# Patient Record
Sex: FEMALE | Race: WHITE | NOT HISPANIC OR LATINO
[De-identification: names, ages, dates, MRNs, and addresses within clinical notes are randomized per-mention and may not be internally consistent; named-entity substitution may affect disease eponyms.]

---

## 2019-02-22 ENCOUNTER — RECORDS - HEALTHEAST (OUTPATIENT)
Dept: ADMINISTRATIVE | Facility: OTHER | Age: 84
End: 2019-02-22

## 2020-01-01 ENCOUNTER — COMMUNICATION - HEALTHEAST (OUTPATIENT)
Dept: PHYSICAL MEDICINE AND REHAB | Facility: CLINIC | Age: 85
End: 2020-01-01

## 2020-01-01 ENCOUNTER — HOSPITAL ENCOUNTER (OUTPATIENT)
Dept: PHYSICAL MEDICINE AND REHAB | Facility: CLINIC | Age: 85
Discharge: HOME | End: 2020-08-19
Attending: PHYSICAL MEDICINE & REHABILITATION

## 2020-01-01 ENCOUNTER — RECORDS - HEALTHEAST (OUTPATIENT)
Dept: LAB | Facility: CLINIC | Age: 85
End: 2020-01-01

## 2020-01-01 ENCOUNTER — RECORDS - HEALTHEAST (OUTPATIENT)
Dept: BONE DENSITY | Facility: CLINIC | Age: 85
End: 2020-01-01

## 2020-01-01 ENCOUNTER — HOSPITAL ENCOUNTER (OUTPATIENT)
Dept: PHYSICAL MEDICINE AND REHAB | Facility: CLINIC | Age: 85
Discharge: HOME | End: 2020-08-12
Attending: PHYSICAL MEDICINE & REHABILITATION

## 2020-01-01 ENCOUNTER — AMBULATORY - HEALTHEAST (OUTPATIENT)
Dept: PHYSICAL MEDICINE AND REHAB | Facility: CLINIC | Age: 85
End: 2020-01-01

## 2020-01-01 ENCOUNTER — COMMUNICATION - HEALTHEAST (OUTPATIENT)
Dept: SCHEDULING | Facility: CLINIC | Age: 85
End: 2020-01-01

## 2020-01-01 ENCOUNTER — RECORDS - HEALTHEAST (OUTPATIENT)
Dept: ADMINISTRATIVE | Facility: OTHER | Age: 85
End: 2020-01-01

## 2020-01-01 ENCOUNTER — AMBULATORY - HEALTHEAST (OUTPATIENT)
Dept: OTHER | Facility: CLINIC | Age: 85
End: 2020-01-01

## 2020-01-01 ENCOUNTER — HOSPITAL ENCOUNTER (OUTPATIENT)
Dept: RADIOLOGY | Facility: HOSPITAL | Age: 85
Discharge: HOME | End: 2020-09-09
Attending: PHYSICAL MEDICINE & REHABILITATION

## 2020-01-01 ENCOUNTER — HOSPITAL ENCOUNTER (OUTPATIENT)
Dept: PHYSICAL MEDICINE AND REHAB | Facility: CLINIC | Age: 85
Discharge: HOME | End: 2020-07-15
Attending: STUDENT IN AN ORGANIZED HEALTH CARE EDUCATION/TRAINING PROGRAM

## 2020-01-01 ENCOUNTER — HOSPITAL ENCOUNTER (OUTPATIENT)
Dept: PHYSICAL MEDICINE AND REHAB | Facility: CLINIC | Age: 85
Discharge: HOME | End: 2020-09-09
Attending: PHYSICAL MEDICINE & REHABILITATION

## 2020-01-01 ENCOUNTER — COMMUNICATION - HEALTHEAST (OUTPATIENT)
Dept: INTERNAL MEDICINE | Facility: CLINIC | Age: 85
End: 2020-01-01

## 2020-01-01 ENCOUNTER — HOSPITAL ENCOUNTER (OUTPATIENT)
Dept: PHYSICAL MEDICINE AND REHAB | Facility: CLINIC | Age: 85
Discharge: HOME | End: 2020-07-22
Attending: PHYSICAL MEDICINE & REHABILITATION

## 2020-01-01 DIAGNOSIS — M80.88XA OSTEOPOROTIC COMPRESSION FRACTURE OF SPINE, INITIAL ENCOUNTER: ICD-10-CM

## 2020-01-01 DIAGNOSIS — M48.061 LUMBAR FORAMINAL STENOSIS: ICD-10-CM

## 2020-01-01 DIAGNOSIS — M54.42 CHRONIC LEFT-SIDED LOW BACK PAIN WITH LEFT-SIDED SCIATICA: ICD-10-CM

## 2020-01-01 DIAGNOSIS — M54.16 LUMBAR RADICULITIS: ICD-10-CM

## 2020-01-01 DIAGNOSIS — K59.03 THERAPEUTIC OPIOID INDUCED CONSTIPATION: ICD-10-CM

## 2020-01-01 DIAGNOSIS — T40.2X5A THERAPEUTIC OPIOID INDUCED CONSTIPATION: ICD-10-CM

## 2020-01-01 DIAGNOSIS — G89.29 CHRONIC LEFT-SIDED LOW BACK PAIN WITH LEFT-SIDED SCIATICA: ICD-10-CM

## 2020-01-01 DIAGNOSIS — S32.040B: ICD-10-CM

## 2020-01-01 DIAGNOSIS — M54.41 CHRONIC LEFT-SIDED LOW BACK PAIN WITH RIGHT-SIDED SCIATICA: ICD-10-CM

## 2020-01-01 DIAGNOSIS — M48.061 BILATERAL STENOSIS OF LATERAL RECESS OF LUMBAR SPINE: ICD-10-CM

## 2020-01-01 DIAGNOSIS — G89.29 CHRONIC LEFT-SIDED LOW BACK PAIN WITH RIGHT-SIDED SCIATICA: ICD-10-CM

## 2020-01-01 DIAGNOSIS — M54.50 CHRONIC RIGHT-SIDED LOW BACK PAIN WITHOUT SCIATICA: ICD-10-CM

## 2020-01-01 DIAGNOSIS — M54.42 ACUTE LEFT-SIDED LOW BACK PAIN WITH LEFT-SIDED SCIATICA: ICD-10-CM

## 2020-01-01 DIAGNOSIS — G89.29 CHRONIC RIGHT-SIDED LOW BACK PAIN WITHOUT SCIATICA: ICD-10-CM

## 2020-01-01 DIAGNOSIS — M80.88XA OTHER OSTEOPOROSIS WITH CURRENT PATHOLOGICAL FRACTURE, VERTEBRA(E), INITIAL ENCOUNTER FOR FRACTURE (H): ICD-10-CM

## 2020-01-01 LAB
25(OH)D3 SERPL-MCNC: 25.8 NG/ML (ref 30–80)
ALBUMIN SERPL-MCNC: 3.9 G/DL (ref 3.5–5)
ALP SERPL-CCNC: 60 U/L (ref 45–120)
ALT SERPL W P-5'-P-CCNC: 14 U/L (ref 0–45)
ANION GAP SERPL CALCULATED.3IONS-SCNC: 10 MMOL/L (ref 5–18)
AST SERPL W P-5'-P-CCNC: 17 U/L (ref 0–40)
BILIRUB SERPL-MCNC: 0.4 MG/DL (ref 0–1)
BUN SERPL-MCNC: 26 MG/DL (ref 8–28)
CALCIUM SERPL-MCNC: 9.6 MG/DL (ref 8.5–10.5)
CHLORIDE BLD-SCNC: 104 MMOL/L (ref 98–107)
CO2 SERPL-SCNC: 26 MMOL/L (ref 22–31)
CREAT SERPL-MCNC: 0.94 MG/DL (ref 0.6–1.1)
ERYTHROCYTE [DISTWIDTH] IN BLOOD BY AUTOMATED COUNT: 17.4 % (ref 11–14.5)
GFR SERPL CREATININE-BSD FRML MDRD: 55 ML/MIN/1.73M2
GLUCOSE BLD-MCNC: 109 MG/DL (ref 70–125)
HCT VFR BLD AUTO: 44.2 % (ref 35–47)
HGB BLD-MCNC: 14.2 G/DL (ref 12–16)
MCH RBC QN AUTO: 32.4 PG (ref 27–34)
MCHC RBC AUTO-ENTMCNC: 32.1 G/DL (ref 32–36)
MCV RBC AUTO: 101 FL (ref 80–100)
PLATELET # BLD AUTO: 244 THOU/UL (ref 140–440)
PMV BLD AUTO: 11.2 FL (ref 8.5–12.5)
POTASSIUM BLD-SCNC: 4.3 MMOL/L (ref 3.5–5)
PROT SERPL-MCNC: 6.7 G/DL (ref 6–8)
PTH-INTACT SERPL-MCNC: 66 PG/ML (ref 10–86)
RBC # BLD AUTO: 4.38 MILL/UL (ref 3.8–5.4)
SODIUM SERPL-SCNC: 140 MMOL/L (ref 136–145)
T4 FREE SERPL-MCNC: 0.9 NG/DL (ref 0.7–1.8)
T4 FREE SERPL-MCNC: 1 NG/DL (ref 0.7–1.8)
TSH SERPL DL<=0.005 MIU/L-ACNC: 3.03 UIU/ML (ref 0.3–5)
TSH SERPL DL<=0.005 MIU/L-ACNC: 5.09 UIU/ML (ref 0.3–5)
VIT B12 SERPL-MCNC: 281 PG/ML (ref 213–816)
WBC: 8.9 THOU/UL (ref 4–11)

## 2020-01-01 RX ORDER — CALCITONIN SALMON 200 [IU]/.09ML
1 SPRAY, METERED NASAL DAILY
Qty: 3.7 ML | Refills: 1 | Status: SHIPPED | OUTPATIENT
Start: 2020-01-01

## 2020-01-01 RX ORDER — ACETAMINOPHEN 500 MG
1000 TABLET ORAL 3 TIMES DAILY
Status: SHIPPED | COMMUNITY
Start: 2020-01-01

## 2020-01-01 RX ORDER — LISINOPRIL 30 MG/1
30 TABLET ORAL DAILY
Status: SHIPPED | COMMUNITY
Start: 2020-01-01

## 2020-01-01 ASSESSMENT — MIFFLIN-ST. JEOR: SCORE: 722.11

## 2021-06-01 ENCOUNTER — RECORDS - HEALTHEAST (OUTPATIENT)
Dept: ADMINISTRATIVE | Facility: CLINIC | Age: 86
End: 2021-06-01

## 2021-06-04 VITALS — WEIGHT: 96 LBS | BODY MASS INDEX: 19.35 KG/M2 | HEIGHT: 59 IN

## 2021-06-09 NOTE — TELEPHONE ENCOUNTER
"Per Dr Mendoza request - called living facility, [facility] (Spoke with \"Julisa\") - let them know that Miralax was ordered (should be ready to be picked up at the pharmacy - or family can purchase this over the counter) and they should give her 1 packet or capful which = 17 grams per day while she is taking Oxycodone.  This is important to take while she takes Oxycodone to prevent any constipation.       Julisa states understanding.  "

## 2021-06-09 NOTE — PATIENT INSTRUCTIONS - HE
Follow-up visit with Dr. Mendoza in 2 weeks to discuss injection outcome and determine care plan going forward.       DISCHARGE INSTRUCTIONS    During office hours (8:00 a.m.- 4:00 p.m.) questions or concerns may be answered  by calling Spine Center Navigation Nurses at  [phone].  Messages received after hours will be returned the following business day.      In the case of an emergency, please dial 911 or seek assistance at the nearest Emergency Room/Urgent Care facility.     All Patients:    ? You may experience an increase in your symptoms for the first 2 days (It may take anywhere between 2 days- 2 weeks for the steroid to have maximum effect).    ? You may use ice on the injection site, as frequently as 20 minutes each hour if needed.    ? You may take your pain medicine.    ? You may continue taking your regular medication after your injection. If you have had a Medial Branch Block you may resume pain medication once your pain diary is completed.    ? You may shower. No swimming, tub bath or hot tub for 48 hours.  You may remove your bandaid/bandage as soon as you are home.    ? You may resume light activities, as tolerated.    ? Resume your usual diet as tolerated.    ? It is strongly advised that you do not drive for 1-3 hours post injection.    ? If you have had oral sedation:  Do not drive for 8 hours post injection.      ? If you have had IV sedation:  Do not drive for 24 hours post injection.  Do not operate hazardous machinery or make important personal/business decisions for 24 hours.      POSSIBLE STEROID SIDE EFFECTS (If steroid/cortisone was used for your procedure)    -If you experience these symptoms, it should only last for a short period      Swelling of the legs                Skin redness (flushing)       Mouth (oral) irritation     Blood sugar (glucose) levels              Sweats                      Mood changes    Headache    Sleeplessness         POSSIBLE PROCEDURE SIDE EFFECTS  -Call  the Spine Center if you are concerned    Increased Pain             Increased numbness/tingling        Nausea/Vomiting            Bruising/bleeding at site        Redness or swelling                                                Difficulty walking        Weakness             Fever greater than 100.5    *In the event of a severe headache after an epidural steroid injection that is relieved by lying down, please call the [facility] to speak with a clinical staff member*

## 2021-06-09 NOTE — TELEPHONE ENCOUNTER
Please call patient (or son Skip or daughter Aviva) to let her know that she does need a DXA scan and then she will need an appointment with an osteoporosis specialist to discuss treatment for her osteoporosis. An order for the DXA is placed and a referral to the osteoporosis clinic has been placed.  They will call to schedule the appointment and the osteoporosis physician is trying to facilitate a quick appointment (can be done virtually if preferred).

## 2021-06-09 NOTE — PROGRESS NOTES
ASSESSMENT: Janina Mccallum is a 89 y.o. female  with a BMI of 19.56 with past medical history significant for hypertension, hyperlipidemia, PAD, breast cancer, chronic pancreatitis who presents today for new patient evaluation of acute on chronic left-sided low back pain with left radicular/sciatic type leg symptoms.  Pain has been present for the last 6 to 8 weeks, but significantly worsening in the last 3 to 4 weeks.  Patient does have an L4 compression fracture seen on her MRI which is likely the cause of her severe pain.  There is also lumbar foraminal stenosis at the L4-5 level which is likely causing nerve root impingement and contributing to the radicular/sciatic type leg symptoms.  She does have a history of previous injections that have resolved pain, though those records are not available today.  Her physical exam is difficult, but she does not appear to have any focal neurologic deficit.  There does appear to be a diminished left patellar reflex compared to the right.  He is without any red flag symptoms from as best the physician can tell.  Her history was somewhat challenging.    The patient son is requesting that her care be transferred from [facility], as this location is much more convenient for them.    PSP:  Dr. Mendoza    AMERICA:  48 %  WHO-5:  4 (The patient is not interested in behavioral health therapy)    PLAN:  A shared decision making model was used.  The patient's values and choices were respected.  The following represents what was discussed and decided upon by the physician and the patient.  Her son Skip accompanied her to the appointment today.  Her daughter Aviva was on the phone for the entire encounter.    1.  DIAGNOSTIC TESTS/OUTSIDE RECORDS/REFERRALS:   - The patient's MRI of the lumbar spine from July 7 of 2020 was personally reviewed today by the physician with physician performing her own interpretation.  Patient does have a significant scoliosis.  The patient does  have an acute to subacute L4 compression fracture with 25% height loss.  There is retropulsion with resultant central canal stenosis.  There is an old L1 compression fracture with 30% height loss.  No retropulsion at the L1 level.  There is moderate to severe central canal stenosis at the T12-L1 level with bilateral lateral recess stenosis and foraminal stenosis.  There is severe central canal stenosis seen at the L1-2 and L2-3 levels.  There is varying degrees of foraminal stenosis at these levels.  There is severe central canal stenosis at the L3-4 level.  There is significant facet arthropathy seen at the L3-4 level with lateral recess stenosis and foraminal stenosis.  There is a disc bulge at the L4-5 level which causes severe central canal stenosis, lateral recess stenosis and bilateral foraminal stenosis.  There is degenerative changes of the L5-S1 facets with mild central canal stenosis and right greater than left foraminal stenosis.  -The patient did sign a release of information for [facility] spine so that her injection records can be reviewed.  It seems that she has had some type of injection in February 2019 that provided significant relief.  -Dr. Mendoza did communicate directly with 1 of the [facility] neurosurgeons to ensure that they do not want to see the patient given the retropulsion seen on her MRI.  Her functioning, exam, and history are fairly reassuring that surgery involvement would not be needed.  - Dr. Mendoza communicate directly with 1 of the osteoporosis specialists to find out if the patient could start osteoporosis treatment (such as Prolia) immediately or if she does need to have an updated DEXA scan.  Dr. Mendoza will order DEXA scan for further work-up of her osteoporosis if needed.  2.  PHYSICAL THERAPY: Dr. Mendoza did discuss the option of physical therapy with the patient.  At this time given her mental status and the challenges of removing her from the nursing home to go to  physical therapy does not seem feasible and the risk-benefit ratio does not seem worth it.  Her son is in agreement.  We will hold off on physical therapy at this time.  She is encouraged to remain as active and independent as safely possible.  3.  MEDICATIONS:    -Patient has been managing the pain with oxycodone.  A  check was run today and she is deemed to be low risk and appropriate for short-term opiate use to help manage her fracture pain.  Oxycodone 5 mg, 1 tablet up to twice a day as needed for pain was prescribed today.  Number 60 tablets with no refills was given.  This is a 1 month supply.  Given that she is in a nursing home, the pharmacy is asked to give this quantity.  -A prescription for MiraLAX was given today.  She should take 17 g once a day on a regular basis while she is taking oxycodone.  This is to help prevent any opioid-induced constipation.  This was not initially prescribed at the time of the visit.  The medical assistant did call the patient's living facility to let them know that this was being prescribed and that they should get this to the patient.  -Calcitonin nasal spray is also prescribed today.  She can use 1 spray/day.  She should alternate nostrils each day.  -She can continue to take the Tylenol and naproxen as needed for pain.  -She can continue to use a lidocaine patch as needed for pain.  4.  INTERVENTIONS: Discussed a left L4-5 transforaminal epidural steroid injection to see if this will help with some of the back pain and the sciatica type leg pain that the patient is experiencing.  Dr. Mendoza explained that this will not necessarily help with any of the fracture pain, its more to help with the sciatica type pain.  Dr. Mendoza did explain the cortisone is used for the injection.  Cortisone does somewhat weaken the immune system, and it is unknown if the cortisone could make her more likely to get the COVID-19 virus or if she would be sicker than she would have been.  At  this point the family feels that the chance of her uli the COVID-19 virus is very low and they are willing to proceed.  Dr. Mendoza also explained that the cortisone can potentially impair healing.  At this point when they weigh the risks and the benefits, they do feel a quality of life is most important and they are willing to try the cortisone injection to see if this will help relieve some of her pain and make her more comfortable.  5.  PATIENT EDUCATION:   -Discussed with the patient and her family that it may be best to treat the osteoporosis, as when she has a fracture, she is much more likely to fracture something else.  Having a fracture at her age could be devastating at this point.  They are open to treatment depending on what it is.  They seem to be in favor of an infusion that she can have once every 6 months, as this would be fairly easy for them to manage.  -Explained that wearing an elastic back brace can provide comfort for fracture type pain.  Dr. Mendoza recommended an over-the-counter elastic back brace it can be purchased at a [employer].  She did explain that given the patient's scoliosis, an elastic back brace may be more accommodating and much more comfortable.  The other option is a spinal orthotic, which is more of a hard back brace.  Given her scoliosis deformity, may be difficult to find a comfortable her back brace.  Dr. Mendoza explained that the brace is just for comfort only, we will not speed or impair healing if it is not warranted.  The family preferred to go with an elastic back brace at this time.  -All of their questions were answered to their satisfaction today.  They were in agreement with the treatment plan.  6.  FOLLOW-UP:   She will follow-up in approximately 1 week for the epidural steroid injection.  If there are any questions/concerns or any significant worsening of pain prior to that time, the patient is asked to call the clinic via the nurse navigation  line or via MyChart.       SUBJECTIVE: History was extremely difficult secondary to patient's mental status and due to her being VERY hard of hearing.  Her son Skip provided most of the history and he seemed to be a very reliable historian.  Janina Mccallum  Is a 89 y.o. female who presents today for new patient evaluation of low back pain with radiation going down into the left leg.  Her son states that the pain started about 6 to 8 weeks ago, but she has deteriorated very quickly and they state that it has been significantly worsening over the last couple of weeks.  They state that the nursing home is in fairly frequent contact with them regarding the patient.  They said that they really have not been able to contact the patient directly since March due to the COVID-19 shutdown.  Staff has been saying that she has been having intermittent pain.  It seemed to escalate, and so on July 7 of 2020 they did bring the patient to [facility] emergency room.  An MRI of the lumbar spine was performed at that time.  She was given IV fluids, oxycodone, and prednisone.  They reports that later that day she was like a new person.  They report that even the staff at her nursing home commented that she is doing significantly better.  However 3 to 4 days later, she is beginning to have more more pain.  She still requires pain medication in the morning and at bedtime.    The patient does state that she has left-sided low back pain and radiates all the way down the left leg to approximately the ankle.  She seems to deny any pain in the foot or in the toes.  She says that it tends to be worse in the morning and in the middle of the night.  She admits that she will often scream and yell secondary to the pain.  She is able to walk using her walker, but she states that this is difficult.  She does feel that the pain pills are helping.  Laying down and sleeping also help to relieve pain.  She is not had any other treatments.    The  "patient tells the physician today \"You seem so smart.  You're as smart as a doctor!\"    Medications:  Reviewed and correct in the chart.      Allergies: Reviewed and NKDA per the patient and her son.    PMH:  Reviewed and significant for HTN, dyslipidemia, PAD, history of breast cancer, and chronic pancreatitis    PSH:  Reviewed and significant for left breast mastectomy.    Family History:  Reviewed and significant for breast cancer in her mother and sister.    Social History: The patient has a history of smoking.  Up until about 3 years ago she smoked about a pack per day.  Over the last year she is only smoked about 3 cigarettes/day.  Since March she has not been able to smoke as they have not been able to provide her with cigarettes.  She denies any alcohol or illicit drug use.    ROS: Positive for 4 pound weight gain in the last 6 months (being managed with boost supplementation), joint pain, muscle pain, muscle fatigue.   Specifically negative for bowel/bladder dysfunction, fevers,chills.   Otherwise 13 systems reviewed are negative.  Please see the patient's intake questionnaire from today for details.      OBJECTIVE:  PHYSICAL EXAMINATION:  Physical examination was very challenging as patient had trouble hearing and also seemed to have significant difficulty comprehending what was being asked of her.      CONSTITUTIONAL:  Vital signs as above.  No acute distress.  The patient is well nourished and well groomed.  PSYCHIATRIC:  The patient is awake, alert, oriented to person, place, time and answering questions appropriately with clear speech.    SKIN:  Skin over the face, bilateral lower extremities, and posterior torso is clean, dry, intact without rashes.  There is some bruising over the dorsal aspect of the left foot near the third and fourth toes.  GAIT: Gait is narrow based with small shuffling steps but she can ambulate from the wheelchair to the exam table with contact guard assist.     MUSCLE " STRENGTH:  The patient has 5/5 strength for the bilateral hip flexors, knee flexors/extensors, ankle dorsiflexors/plantar flexors, great toe extensors, ankle evertors/invertors.  NEUROLOGICAL:  2+/4 patellar reflex on the right side with diminished left patellar reflex.  Unable to elicit medial hamstring or achilles reflexes bilaterally (but patient would not relax muscles for this part of the exam).  Reactive/equivocal Babinski's bilaterally.  No ankle clonus bilaterally. Sensation to light touch is intact in the bilateral L4, L5, and S1 dermatomes.  VASCULAR:  2/4 dorsalis pedis pulses bilaterally.  Bilateral lower extremities are warm.  There is mild pitting edema of the bilateral lower extremities.  ABDOMINAL:  Soft, non-distended, non-tender throughout all quadrants.  No pulsatile mass palpated in the left lower quadrant.  LYMPH NODES:  No palpable or tender inguinal/popliteal lymph nodes.  MUSCULOSKELETAL: Negative straight leg raising test bilaterally.  She did have significant discomfort initially laying down on the table, but then seemed to be okay.  She did not have any significant pain with left hip or right hip internal/external range of motion testing.

## 2021-06-09 NOTE — TELEPHONE ENCOUNTER
"Left very detailed message for son Skip. If he calls please schedule office visit with Dr. Thurman at next available face to face. Please put \"osteo consult - Ok per Dr. Thurman\" in the notes.   "

## 2021-06-09 NOTE — TELEPHONE ENCOUNTER
Spoke with son, Skip (consent to communicate on file), to set up appt for telephone visit for Osteoporosis consult per dr Thurman    Son stated it would be very difficult to do as mom is 89, extremely hard of hearing and in assisted living where there are no visitors allowed.    He would like to know what the benefits of this appointment would be, if any due to mom's age

## 2021-06-09 NOTE — PATIENT INSTRUCTIONS - HE
There is an L4 compression fracture in her back.  There is also nerve root compression at the L4 level, likely causing the radiation of pain going down into the left leg.    She would benefit from wearing an elastic back brace.  This elastic back brace can be bought at any sporting [employer] store or [employer].  It should be worn around the back.  It is strictly for comfort.  Dr. Mendoza would recommend wearing it during the day.  It can be removed for bathing and for sleep at night.  It does not have to be worn constantly throughout the day.  She can ask staff to remove it or she can take it off herself if she wants to take a break from wearing it.    Oxycodone 5 mg, 1 tablet up to twice a day as needed for pain is prescribed today.  Please lock this medication up when you are not taking it.  Do not share this medication with other people.  Do not increase the dose without permission from your physician.  Do not drink alcohol while you take this medication as this can lead to death.  Do not take other pain medications without approval from your physician or this can also lead to death.  If you need a refill of this medication, you must come in to clinic by appointment.  Please call if you have any questions on how to take this medication.    Calcitonin nasal spray as prescribed today to help with pain from the fracture.  Please spray 1 spray into a nostril each day.  You should alternate nostrils each day (right nostril on odd days and left nostril on even days).    A left L4-5 transforaminal epidural steroid injection has been ordered today.  Please schedule this injection at least 1 week from now to allow time for insurance prior authorization.  On the day of your injection, you cannot be sick or taking antibiotics.  If you become sick and are prescribed, please call the clinic so your injection can be rescheduled for once you have completed your antibiotics.  You will need to bring a  with you for your injection.    If you have any questions or concerns prior to your injection, please do not hesitate to call the nurse navigation line at [phone] or contact Dr. Mendoza through [employer].    Dr. Mendoza will contact the osteoporosis clinic about whether or not she needs to have an updated DEXA scan (bone density scan) or if she can start osteoporosis infusion treatments right away.  Their office will contact you for an appointment.  Please contact Dr. Mendoza's office if you have not heard from them within a week.

## 2021-06-09 NOTE — TELEPHONE ENCOUNTER
Patient Returning Call  Reason for call:  Follow up  Information relayed to patient:  The writer read the following to patient;' son  per Dr Thurman:She is at very high risk for future fracture.  Treatment can lower future fracture risk even at age 89.  She could see me just for a virtual visit.  Son could help with history.  We would need to get some labs done.  Optimally, a bone density study would be best to do as a baseline to monitor therapy.  If it is extremely difficult for her to have the study done, we could probably start treatment without it.  Son Mik states it is really hard right now to do a virtual visit .  Patient is limited visitors in her facility and cannot hear well so needs a family member with Janina.  It would work much better if patient can be seen face to face.  Also it is difficult to get the patient out and about.  If the medication therapy can be started vs a bone density it would work better.  Please advise.   Patient has additional questions:  Yes  If YES, what are your questions/concerns:  See above  Okay to leave a detailed message?: Yes

## 2021-06-09 NOTE — TELEPHONE ENCOUNTER
Son Skip was called and informed. Stated understanding.  Skip also said patient will still be coming in next Wed for the spine injection, as planned.

## 2021-06-10 NOTE — PROGRESS NOTES
Assessment:   Janina Mccallum is a 89 y.o. y.o. female with past medical history significant for hypertension, hyperlipidemia, PAD, breast cancer, chronic pancreatitis who presents today for follow-up regarding left-sided low back pain with left radicular/sciatic type leg symptoms.  The patient does have an L4 compression fracture which is likely the cause of her pain.  This also contributes to left L4-5 foraminal stenosis, thought to be the cause of her leg symptoms.  She is status post a left L4-5 transforaminal epidural steroid injection on July 22 of 2020.  At this time, he has not noticed any relief whatsoever following the injection.  She continues to report very severe pain.  Her MRI shows severe left L4-5 lateral recess stenosis with left L5 nerve root impingement.  She is without any red flag symptoms.    Elastic back brace.    PSP:  Dr. Mendoza       Plan:     A shared decision making plan was used.  The patient's values and choices were respected.  The following represents what was discussed and decided upon by the physician and the patient.  The patient son, Skip, was present for the entire encounter.    1.  DIAGNOSTIC TESTS: The patient's MRI of the lumbar spine from July 7, 2020 was personally reviewed today by the physician with the physician performing her own interpretation.  There is a severe scoliosis.  There is an L4 compression fracture with approximately 25% height loss.  There is an old L1 compression fracture with approximately 30% height loss.  There is retropulsion at the L4 level but not at the L1 level.  There is central canal stenosis at the L1-2 and L2-3 levels with varying degrees of foraminal stenosis.  Severe central canal stenosis at the L3-4 level with bilateral lateral recess stenosis and foraminal stenosis.  Severe central canal stenosis at L4-5 with severe bilateral L4-5 foraminal stenosis.  There is also recess stenosis and central canal stenosis at the L4-5 level with likely  left L5 nerve impingement.  Degenerative changes seen at L5 and S1.  - OUTSIDE RECORDS: These are reviewed by the physician and are summarized as follows:  -April 19, 2019, L3-4 interlaminar epidural steroid injection.  -February 22, 2019: The bilateral L4-5 transforaminal epidural steroid injections.  -A DEXA scan has been ordered for further work-up regarding her osteoporosis given the fractures.  Her son did not schedule this because he did not think that she would be able to tolerate laying still for 30 minutes.  Dr. Mendoza explained that she was able to lie still for 45 minutes for the MRI of the lumbar spine, so most likely she will be able to tolerate the DEXA scan.  He says that he will call to reschedule this.  The number was provided today.  2.  PHYSICAL THERAPY: Patient is not thought to be a good physical therapy candidate given her mental status (difficulty with memory and patient has dementia) as well as challenges from removing her from the nursing home to go to physical therapy.  Risk-benefit ratio is not conducive to trialing physical therapy.  She is encouraged to remain as active and independent as safely possible.  3.  MEDICATIONS:    -The patient had previously tolerated oxycodone for pain management.  A refill of this medication is provided today after a  check is running.  She has not received any other prescriptions for opiate pain medications from other providers.  Oxycodone 5 mg 1 tablet twice a day as needed for pain is prescribed today.  Number 60 tablets with no refills is given.  A larger quantity is given since the patient is in a nursing home and it is difficult for her to leave.  -As long as she is taking an opiate pain medication, she should remain on MiraLAX to prevent opioid-induced constipation.  The order was written that she should get a dose of MiraLAX each time that she gets a dose of oxycodone.  If she has loose stool, then she can decrease to MiraLAX once a day on the  days that she gets oxycodone.  -She can continue with the calcitonin nasal spray, 1 spray/day, alternating nostrils each day.  -Prescription for Aleve 220 mg was prescribed today.  She can take 1 to 2 tablets up to twice a day as needed for pain.  Her son did request that this be ordered through the pharmacy to be delivered directly to [facility].  He prefers this as opposed to buying the prescription and dropping it off at [facility].  Dr. Mendoza did explain that this can be high risk in patients of her age, as it can affect the kidneys.  However her recent creatinine was well within normal limits.  We will try this short-term to help manage her pain.  -She can continue to take over-the-counter Tylenol and naproxen as needed.  -She can continue to use a lidocaine patch.  4.  INTERVENTIONS: Discussed a left L5-S1 transforaminal epidural steroid injection.  Given her fracture at L3-4 and the significant central canal stenosis seen at the L3-4 and L4-5 levels, Dr. Mendoza does not feel that it is safe to try either an L3-4 or an L4-5 interlaminar epidural steroid injection.  Given the significant lateral recess stenosis at the L4-5 level and that her pain is in an L5 distribution (posterior lateral thigh and posterior lateral lower leg) would recommend a left L5-S1 TFESI.  The rationale for this injection was explained to the son and he is in agreement.  5.  PATIENT EDUCATION:    -Encouraged the son to buy the elastic back brace it was discussed earlier.  Explained that this can provide significant relief.  -All of his questions were answered to his satisfaction today.  6.  FOLLOW-UP: Patient follow-up next week for the left L5-S1 TFESI.  If there are any questions/concerns or any significant worsening of pain prior to that time, the patient is asked to call the clinic via the nurse navigation line or via [employer].     Subjective:     Janina GRANT Alessio is a 89 y.o. female who presents today for follow-up regarding  "left low back pain with left radicular/sciatic type leg pain.  Patient is status post a left L4-5 transforaminal epidural steroid injection performed on July 22, 2020.  Skip does not feel that the patient received any relief whatsoever with the injection.  She states that she continues to have significant left-sided low back pain and left leg pain.  She states that the pain starts in the left low back and then it radiates down the posterior lateral left thigh and into the posterior lateral left calf.  She does have some numbness and tingling.  She does feel like the left leg is weak.  Reports that the pain is so bad that she will \"holler.\"  She reports that she can help tolerate because her pain is so bad.  She rates her pain today at a 9 out of 10.  At worst is a 9 out of 10.  At best is a 6 out of 10.  Her pain is worse with being up and moving around.  She does feel little bit better sitting down.  She denies any new symptoms at this time.  She is not able to undergo physical therapy at this time due to her mental status.  She does feel that the oxycodone is somewhat helpful.  The nursing home facility has been providing this as well as Aleve occasionally.  Her son is wondering if she could take Aleve a little more frequently.    Past medical history is reviewed and is unchanged for any new medical diagnoses in the interim.      Family history is reviewed and is unchanged in the interim.      Review of Systems:  Positive for numbness and tingling, weakness in the legs, pain that makes it difficult to sleep at night..  Pertinent negatives include no fevers, chills, unexplained weight loss, bowel incontinence, bladder incontinence, trips, stumbles, falls.  All others reviewed and are negative.     Objective:   CONSTITUTIONAL:  Vital signs as above.  No acute distress.  The patient is well nourished and well groomed.    PSYCHIATRIC:  The patient is awake, alert, oriented to person, place and time.  The patient is " answering questions appropriately with clear speech.  Normal affect.  SKIN:  Skin over the face, posterior torso, bilateral upper and lower extremities is clean, dry, intact without rashes.  MUSCULOSKELETAL: Patient remained in a wheelchair for the entire exam.  She points to pain over the left low back.  However it is not tender to palpation.  She points to pain going down the posterior lateral aspect of the left thigh into the calf.  She is not able to follow instructions enough to be able to test manual muscle strength.  She can flex her hips, raising her knees up and down easily, without significant pain.  She is able to extend and flex her knees easily in the seated position, without any significant pain.  She is able to flex and dorsiflex her ankles easily without any difficulty.  She does not have any pain with doing this.  NEUROLOGICAL: Absent patellar, medial hamstring, achilles reflexes which are symmetric bilaterally.  No ankle clonus bilaterally.  She is not able to answer questions in order to be able to tell if her sensation is intact to light touch in the bilateral L4, 5, and S1 dermatomes.

## 2021-06-10 NOTE — PATIENT INSTRUCTIONS - HE
Please follow up two weeks post procedure with Dr Mendoza to evaluate your plan of care.       DISCHARGE INSTRUCTIONS    During office hours (8:00 a.m.- 4:00 p.m.) questions or concerns may be answered  by calling [facility] Navigation Nurses at  [phone].  Messages received after hours will be returned the following business day.      In the case of an emergency, please dial 911 or seek assistance at the nearest Emergency Room/Urgent Care facility.     All Patients:    ? You may experience an increase in your symptoms for the first 2 days (It may take anywhere between 2 days- 2 weeks for the steroid to have maximum effect).    ? You may use ice on the injection site, as frequently as 20 minutes each hour if needed.    ? You may take your pain medicine.    ? You may continue taking your regular medication after your injection. If you have had a Medial Branch Block you may resume pain medication once your pain diary is completed.    ? You may shower. No swimming, tub bath or hot tub for 48 hours.  You may remove your bandaid/bandage as soon as you are home.    ? You may resume light activities, as tolerated.    ? Resume your usual diet as tolerated.    ? It is strongly advised that you do not drive for 1-3 hours post injection.    ? If you have had oral sedation:  Do not drive for 8 hours post injection.      ? If you have had IV sedation:  Do not drive for 24 hours post injection.  Do not operate hazardous machinery or make important personal/business decisions for 24 hours.      POSSIBLE STEROID SIDE EFFECTS (If steroid/cortisone was used for your procedure)    -If you experience these symptoms, it should only last for a short period      Swelling of the legs                Skin redness (flushing)       Mouth (oral) irritation     Blood sugar (glucose) levels              Sweats                      Mood changes    Headache    Sleeplessness         POSSIBLE PROCEDURE SIDE EFFECTS  -Call the Spine Center if  you are concerned    Increased Pain             Increased numbness/tingling        Nausea/Vomiting            Bruising/bleeding at site        Redness or swelling                                                Difficulty walking        Weakness             Fever greater than 100.5    *In the event of a severe headache after an epidural steroid injection that is relieved by lying down, please call the [facility] to speak with a clinical staff member*

## 2021-06-10 NOTE — TELEPHONE ENCOUNTER
Nurse navigation to fax this updated order to patient's living facility (Formerly named [facility]).    UPDATED NAPROXEN ORDER:    Naproxen 220 mg (1 tablet) po two times a day PRN pain if pain is 1-5/10.  Naproxen 440 mg (2 tablets) po two times a day PRN pain if pain is 6-10/10.  MAXIMUM TOTAL of 880 mg in a 24 hour period.

## 2021-06-11 NOTE — TELEPHONE ENCOUNTER
Dr. Collado, the PCP for this patient at [facility] called back today.  Dr. Mendoza explained that it is difficult to know where the patient's pain is coming from at this point.  She did appear to respond well to the previous left L4-5 transforaminal epidural steroid injection but did not respond recently to the right low back epidural steroid injection for the right-sided pain.  She does have an L4 compression fracture, but it is unknown if this is causing pain.  She does have some foraminal stenosis which could be contributing as well.  At this point Dr. Mendoza is hesitant to continue moving forward with spine injections for pain management.    Dr. Collado is aware that the DXA scan showed osteopenia.  She states the patient is already taking calcium and vitamin D.  She does not feel that the patient would be a candidate for Fosamax, she does not think that she would be able to swallow it or remain upright for 30 minutes afterwards.  She does not feel that other osteopenia treatments are necessary at this time, due to the risk-benefit ratio.    Dr. Mendoza did discuss the calcitonin can be continued to see if this would help with pain.  There is no harm in trying this long-term at this point.  Did briefly discussed that there was some concern that long-term use could potentially cause cancer, but this was disproven and later studies.  It is likely low risk to try to see if this would help provide some pain relief.    Dr. Mendoza mentioned that she let the family know that they could try an elastic back brace to see if this would provide some relief.    Dr. Mendoza has discussed this case with [facility] neurosurgery who did not feel that the patient would need surgery given that there are no neurologic deficits, and they feel that surgery would be extremely risky for the patient given her age.    Dr. Collado said that it is very difficult to understand exactly how much pain the patient has.  She says the  staff report that the patient will be up and moving around and is laughing and appears happy and not in pain.  However she appears to have more pain when family is present.  Dr. Collado thinks that maybe the patient has some behavioral issues with regards to pain and family.  She states that there is challenges in measuring pain in a patient with dementia.      Dr. Mendoza asked Dr. Collado if she would be comfortable taking over the oxycodone prescription (the patient does have a prescription for the next 30 days from Dr. Mendoza).  Dr. Mendoza explained that Dr. alessia dalton may be able to adjust the medication more appropriately for the patient as she is in contact with the patient and with the staff at the living facility.  Dr. Collado stated that she would be happy to take over prescribing.  Dr. Mendoza explained that she would be happy to help manage further care, but at this point it is just pain management, likely with medication management.  Dr. Collado is welcome to call Dr. Mendoza at anytime to discuss care.     Nurse navigation:   Will have nurse navigation contact the patient's son Skip or daughter Fatemeh and let them know they can cancel the appointment with Dr. Mendoza in 4 weeks or they can keep appointment if they wish.

## 2021-06-11 NOTE — PATIENT INSTRUCTIONS - HE
An x-ray of your low back is ordered today to monitor the compression fracture.  Dr. Mendoza's office will call with the results.    Oxycodone 5 mg 1 tablet twice a day as needed for pain is prescribed today.  Dr. Mendoza will touch base with Dr. Collado to see if she will take over prescribing this medication so that it can be adjusted more easily..  Please lock this medication up when you are not taking it.  Do not share this medication with other people.  Do not increase the dose without permission from your physician.  Do not drink alcohol while you take this medication as this can lead to death.  Do not take other pain medications without approval from your physician or this can also lead to death.  If you need a refill of this medication, you must come in to clinic by appointment.  Please call if you have any questions on how to take this medication.    Please follow-up with Dr. Mendoza in 1 month.  Please do not hesitate to contact the clinic at [phone] if you have any questions/concerns or any worsening of your pain prior to that time.  You are also welcome to contact Dr. Mendoza via [employer].

## 2021-06-11 NOTE — TELEPHONE ENCOUNTER
"Skip returning call. Reviewed the information regarding the osteopenia and pain management for patient. Stated understanding.     Explained Dr. Mendoza would be happy to see patient back in clinic if they wanted to keep appointment. \"I appreciate that, Dr. Mendoza and all the staff. You guys have been great. Unless something new comes up with Mom, we will just follow with Dr. Collado.\" Appointment cancelled at his request.   "

## 2021-06-11 NOTE — PROGRESS NOTES
Assessment:   Janina Mccallum is a 89 y.o. y.o. female with past medical history significant for hypertension, hyperlipidemia, peripheral arterial disease, breast cancer, chronic pancreatitis who presents today for follow-up regarding acute left-sided low back pain with left radicular/sciatica type leg symptoms.  This pain is thought to be secondary to left L4-5 foraminal stenosis.  Patient also has an L4 compression fracture which is thought to be contributing to her overall pain.  She is status post a left L4-5 transforaminal epidural steroid injection on July 22 of 2020 and most recently a right L4-5 TFESI on August 19 of 2020.  At that time she was complaining of right-sided symptoms, which had not been brought up previously.  She thinks that maybe she has had slight relief, but she still has significant pain.  At this time, she continues to just have right-sided pain going down into the right anterior lateral thigh stopping at the knee.  Etiology of this pain is unknown, but may be from the foraminal stenosis seen at the L4-5 level.  She is without any red flag symptoms at this time.    PSP:  Dr. Mendoza       Plan:     A shared decision making plan was used.  The patient's values and choices were respected.  The following represents what was discussed and decided upon by the physician and the patient.      1.  DIAGNOSTIC TESTS:   -Patient's DEXA scan report was reviewed by the physician and is summarized as follows: The patient has osteopenia and is at high risk for fracture.  -Dr. Mendoza will contact Dr. Collado to see if osteoporosis treatment is needed at this time.  -An x-ray of the lumbar spine is ordered today for further work-up of the compression fracture.  Dr. Mendoza wants to ensure that there is no significant further height loss.  - The patient's MRI of the lumbar spine from July 7 of 2020 was personally reviewed today by the physician with the physician performing her own interpretation.  There is  severe scoliosis.  There is an L4 compression fracture with approximately 25% height loss.  There is an old L1 compression fracture with approximately 30% height loss.  There is retropulsion at the L4 level but not at the L1 level.  There is central canal stenosis at the L1-2 and L2-3 levels with varying degrees of foraminal stenosis.  Severe central canal stenosis at the L3-4 level with bilateral lateral recess stenosis and foraminal stenosis.  Severe central canal stenosis at the L4-5 level with severe bilateral L4-5 foraminal stenosis.  There is also lateral recess stenosis and central canal stenosis at the L4-5 level with likely left L5 nerve root impingement.  There are degenerative changes seen at the L5-S1 level.  2.  PHYSICAL THERAPY: The patient is not deemed to be a good physical therapy candidate given her mental status (difficulty with memory and she has dementia) as well as challenges in removing her from the nursing home to go to physical therapy.  The risk-benefit ratio is not conducive to trialing physical therapy.  She is encouraged to remain as active and is independent as safely possible.  3.  MEDICATIONS: A refill of oxycodone 5 mg was prescribed today after  check was run today.  She is deemed low risk and appropriate for opiate use at this time to help control her pain and give her some sort of quality of life.  Oxycodone 5 mg 1 tablet twice a day as needed for pain, number 60 tablets with no refills was given today.  Dr. Mendoza did call to speak to Dr. Collado who is a physician at [facility].  Dr. Mendoza will see if Dr. Collado will take over prescribing the oxycodone, she may be able to adjust the dose as needed for the patient in real-time as opposed to just the once a month follow-ups that Dr. Mendoza is performing.  -She can continue with calcitonin nasal spray, 1 spray/day, alternating nostrils each day.  -She can continue to take Aleve 220 mg 1 to 2 tablets up to twice a day as needed  for pain.  She can take 1 tablet twice a day if pain is less than 5 out of 10.  She can take 2 tablets twice a day if pain is greater than 6 out of 10.  She should not exceed a total of 4 tablets in a 24-hour period.  4.  INTERVENTIONS: No further injections or neck necessary at this time.  The patient has not responded to the 2 previous epidural steroid injections.  If pain severely worsens, could consider a right sacroiliac joint injection to see if this would help.  She has significant tenderness over the right SI joint to palpation today, and her pain does somewhat follow an SI joint pattern.  Could consider a right L5-S1 TFESI as well, given the lateral recess stenosis seen at the L4-5 level.  Previous injections:  - L3-4 ILESI on 4-19-19  - Biltateral L4-5 TFESIs on 2-22-19  5.  PATIENT EDUCATION:    -Discussed with the patient and her son Skip, that medication management may be best at this time.  -All of their questions were answered to their satisfaction today.  They expressed gratitude for the care received.  6.  FOLLOW-UP: They are asked to follow-up in 1 month for medication management.  If there are any questions/concerns or any significant worsening of pain prior to that time, the patient is asked to call the clinic via the nurse navigation line or via [employer].     Subjective:     Janina Mccallum is a 89 y.o. female who presents today for follow-up regarding continued right-sided low back pain with radiation of pain going down to the right anterior lateral thigh.  Patient is status post a right L4-5 transforaminal epidural steroid injection on this 19th of 2020.  Her son thinks that the injection may have helped a little bit, but he states that it has not helped a lot.  He says that he feels the oxycodone helps the most.  When she takes it, she seems to be significantly more comfortable and less agitated.  Janina states that she continues to have a significant amount of pain that makes her cry out  at times.  She is rating the pain at a 7 out of 10.  Is located in the right low back and she points to the right SI joint.  She also draws pain over the right anterior lateral thigh stopping at the knee.  She does have some numbness and tingling but does not say that she has any weakness.  Her pain is worse with moving, particularly transitioning from sit to stand.  She does feel little bit better when she is sleeping.  She denies any new symptoms at this time.  Again the oxycodone is helpful.  She is also taking the Tylenol 1000 mg 3 times a day in addition to the calcitonin daily.  He has switched primary care physicians to Dr. Collado, as this doctor is the one that oversees residence at [facility].  They are trying to keep everything as simple as possible.    Past medical history is reviewed and is unchanged for any new medical diagnoses in the interim.      Family history is reviewed and is unchanged in the interim.      Review of Systems:  Positive for numbness and tingling.  Pertinent negatives include no fevers, chills, unexplained weight loss, bowel incontinence, bladder incontinence, trips, stumbles, falls.  All others reviewed and are negative.     Objective:   CONSTITUTIONAL:  Vital signs as above.  No acute distress.  The patient is well nourished and well groomed.    PSYCHIATRIC:  The patient is awake, alert, oriented to person, place and time.  The patient is answering questions appropriately with clear speech.  Normal affect.  SKIN:  Skin over the face, posterior torso, bilateral upper and lower extremities is clean, dry, intact without rashes.  MUSCULOSKELETAL: With min assist, the patient can transition from sit to stand.  She can stand of her own power.  Significant tenderness over the right lumbar paraspinal muscles.      Due to hearing issues and cognitive issues, is difficult to assess strength against resistance.  She is able to flex her hips in the seated position.  She is able to flex and  extend her knees in the seated position.  She can dorsiflex and plantarflex her ankles as well as vashti and invert her ankles.  Resistance was not tested, as patient has difficulty understanding instructions.  NEUROLOGICAL: Absent patellar, medial hamstring, achilles reflexes which are symmetric bilaterally.  No ankle clonus bilaterally.  Sensation to light touch appears to be intact in the bilateral L4, L5, and S1 dermatomes.

## 2021-06-11 NOTE — TELEPHONE ENCOUNTER
Patient's son had returned call after hours. Returned his call this AM to discuss. Left message to return call.

## 2021-06-11 NOTE — TELEPHONE ENCOUNTER
Phone call to patient's son to review results and provider's recommendations. Information shared. States they have tried the back brace already and patient did not like it so she is not using one at this time. Informed we are still awaiting word from PCP regarding treatment of the osteopenia. Stated understanding and appreciation for call back and the communication.

## 2021-06-11 NOTE — TELEPHONE ENCOUNTER
Phone call to patient's son to discuss Dr. Mendoza's information regarding the osteopenia and pain management care as discussed with Dr. Collado. Left message to return call.

## 2021-06-11 NOTE — TELEPHONE ENCOUNTER
----- Message from Indira Walton DO sent at 9/9/2020 11:19 AM CDT -----  Nurse navigation to call the patient's son Skip (patient has dementia and is very hard of hearing) to review the results.  There is some progression of the fracture which is expected.  Dr. Mendoza is still waiting to hear from Dr. Collado about treatment for osteopenia.  The patient could trial an elastic back brace (purchased online or over the counter) to see if this would make her more comfortable during the day (it can be worn as needed - does not need to be worn for anything other than comfort and should be removed when sleeping at night and for bathing).

## 2021-06-16 PROBLEM — I11.0: Status: ACTIVE | Noted: 2020-01-01

## 2021-06-16 PROBLEM — R09.02 HYPOXIA: Status: ACTIVE | Noted: 2020-01-01

## 2021-06-16 PROBLEM — R06.00 DYSPNEA: Status: ACTIVE | Noted: 2020-01-01

## 2021-06-20 NOTE — LETTER
Letter by Indira Garcia DO at      Author: Indira Garcia DO Service: -- Author Type: --    Filed:  Encounter Date: 8/12/2020 Status: (Other)         August 12, 2020     Patient: Janina Mccallum   YOB: 1924   Date of Visit: 8/12/2020       To Whom it May Concern:    Janina Mccallum was seen in my clinic on 8/12/2020. She was prescribed Naproxen (Aleve) 220 mg tablets.     Below is the order for this prescription to be given:  Naproxen 220 mg (1 tablet) po two times a day PRN pain if pain is 1-5/10.  Naproxen 440 mg (2 tablets) po two times a day PRN pain if pain is 6-10/10.      If you have any questions or concerns, please don't hesitate to call.    Sincerely,       Electronically signed by Indira Mendoza DO

## 2021-06-20 NOTE — LETTER
Letter by Indira Garcia DO at      Author: Indira Garcia DO Service: -- Author Type: --    Filed:  Encounter Date: 8/13/2020 Status: (Other)         August 13, 2020     Patient: Janina Mccallum   YOB: 1924   Date of Visit: 8/13/2020       To Whom It May Concern:    Janina Mccallum was seen in my clinic on 8/12/2020. She was prescribed Naproxen (Aleve) 220 mg tablets.      Below is the order for this prescription to be given (updated from 8/12):  Naproxen 220 mg (1 tablet) po two times a day PRN pain if pain is 1-5/10.  Naproxen 440 mg (2 tablets) po two times a day PRN pain if pain is 6-10/10.  MAXIMUM TOTAL of 880 mg in a 24 hour period.      If you have any questions or concerns, please don't hesitate to call.    Sincerely,        Electronically signed by Indira Walton DO

## 2022-05-13 NOTE — TELEPHONE ENCOUNTER
She would need some lab tests done.  This could be done at the time of an in clinic appointment.  Treatment options could be discussed then, and treatment initiated.   **To be referred to once ready to transition from [employer] program.

## 2025-04-23 NOTE — PATIENT INSTRUCTIONS - HE
Over-the-counter Aleve has been prescribed.  You can take 1 to 2 tablets up to twice a day as needed for pain.    Codon 5 mg 1 tablet twice a day as needed for pain as prescribed today..  Please lock this medication up when you are not taking it.  Do not share this medication with other people.  Do not increase the dose without permission from your physician.  Do not drink alcohol while you take this medication as this can lead to death.  Do not take other pain medications without approval from your physician or this can also lead to death.  If you need a refill of this medication, you must come in to clinic by appointment.  Please call if you have any questions on how to take this medication.      Please note that the injection will use cortisone medication.  The cortisone does weaken the immune system somewhat.  It is unknown if it will weaken it to the point that you may be more likely to get the COVID-19 virus.  Or if you do get the COVID-19 virus if you would be sicker than you would have been if you had not had the cortisone injection.    A left L5-S1 transforaminal epidural steroid injection has been ordered today.  Please schedule this injection at least 1 week from now to allow time for insurance prior authorization.  On the day of your injection, you cannot be sick or taking antibiotics.  If you become sick and are prescribed, please call the clinic so your injection can be rescheduled for once you have completed your antibiotics.  You will need to bring a  with you for your injection.  Please wear a mask to the injection.   If you have any questions or concerns prior to your injection, please do not hesitate to call the nurse navigation line at [phone] or contact Dr. Mendoza through [employer].          
2 = difficulty swallowing foods